# Patient Record
Sex: MALE | Race: BLACK OR AFRICAN AMERICAN | NOT HISPANIC OR LATINO | Employment: UNEMPLOYED | URBAN - METROPOLITAN AREA
[De-identification: names, ages, dates, MRNs, and addresses within clinical notes are randomized per-mention and may not be internally consistent; named-entity substitution may affect disease eponyms.]

---
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
## 2019-11-13 ENCOUNTER — HISTORICAL (OUTPATIENT)
Dept: ADMINISTRATIVE | Facility: HOSPITAL | Age: 19
End: 2019-11-13

## 2019-11-13 LAB
APPEARANCE, UA: CLEAR
BACTERIA #/AREA URNS AUTO: ABNORMAL /HPF
BILIRUB SERPL-MCNC: NEGATIVE MG/DL
BILIRUB UR QL STRIP: NEGATIVE
BLOOD URINE, POC: NEGATIVE
CLARITY, POC UA: CLEAR
COLOR UR: YELLOW
COLOR, POC UA: YELLOW
GLUCOSE (UA): NEGATIVE
GLUCOSE UR QL STRIP: NEGATIVE
HAV IGM SERPL QL IA: NONREACTIVE
HBV CORE IGM SERPL QL IA: NONREACTIVE
HBV SURFACE AG SERPL QL IA: NEGATIVE
HCV AB SERPL QL IA: NONREACTIVE
HGB UR QL STRIP: NEGATIVE
HIV 1+2 AB+HIV1 P24 AG SERPL QL IA: NONREACTIVE
HYALINE CASTS #/AREA URNS LPF: ABNORMAL /LPF
KETONES UR QL STRIP: NEGATIVE
KETONES UR QL STRIP: NEGATIVE
LEUKOCYTE EST, POC UA: NEGATIVE
LEUKOCYTE ESTERASE UR QL STRIP: NEGATIVE
NITRITE UR QL STRIP: NEGATIVE
NITRITE, POC UA: NEGATIVE
PH UR STRIP: 8 [PH] (ref 4.5–8)
PH, POC UA: 7.5
PROT UR QL STRIP: 10 MG/DL
PROTEIN, POC: NEGATIVE
RBC #/AREA URNS AUTO: ABNORMAL /HPF
SP GR UR STRIP: 1.02 (ref 1–1.03)
SPECIFIC GRAVITY, POC UA: 1.02
SQUAMOUS #/AREA URNS LPF: ABNORMAL /LPF
T PALLIDUM AB SER QL: NONREACTIVE
UROBILINOGEN UR STRIP-ACNC: 4 MG/DL
UROBILINOGEN, POC UA: NORMAL
WBC #/AREA URNS AUTO: ABNORMAL /HPF

## 2020-04-28 LAB
BILIRUB SERPL-MCNC: NEGATIVE MG/DL
BLOOD URINE, POC: NEGATIVE
CLARITY, POC UA: CLEAR
COLOR, POC UA: YELLOW
GLUCOSE UR QL STRIP: NEGATIVE
KETONES UR QL STRIP: NEGATIVE
LEUKOCYTE EST, POC UA: NEGATIVE
NITRITE, POC UA: NEGATIVE
PH, POC UA: 7.5
PROTEIN, POC: NEGATIVE
SPECIFIC GRAVITY, POC UA: 1.02
UROBILINOGEN, POC UA: NORMAL

## 2022-04-10 ENCOUNTER — HISTORICAL (OUTPATIENT)
Dept: ADMINISTRATIVE | Facility: HOSPITAL | Age: 22
End: 2022-04-10

## 2022-04-25 VITALS
SYSTOLIC BLOOD PRESSURE: 117 MMHG | DIASTOLIC BLOOD PRESSURE: 77 MMHG | WEIGHT: 174.19 LBS | BODY MASS INDEX: 24.94 KG/M2 | OXYGEN SATURATION: 100 % | HEIGHT: 70 IN
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
## 2022-09-18 ENCOUNTER — HISTORICAL (OUTPATIENT)
Dept: ADMINISTRATIVE | Facility: HOSPITAL | Age: 22
End: 2022-09-18

## 2024-02-19 ENCOUNTER — HOSPITAL ENCOUNTER (EMERGENCY)
Facility: HOSPITAL | Age: 24
Discharge: HOME | End: 2024-02-19
Attending: INTERNAL MEDICINE
Payer: MEDICAID

## 2024-02-19 VITALS
DIASTOLIC BLOOD PRESSURE: 85 MMHG | HEIGHT: 70 IN | BODY MASS INDEX: 30.39 KG/M2 | OXYGEN SATURATION: 99 % | SYSTOLIC BLOOD PRESSURE: 126 MMHG | HEART RATE: 64 BPM | TEMPERATURE: 99 F | WEIGHT: 212.31 LBS | RESPIRATION RATE: 17 BRPM

## 2024-02-19 DIAGNOSIS — S05.02XA ABRASION OF LEFT CORNEA, INITIAL ENCOUNTER: Primary | ICD-10-CM

## 2024-02-19 DIAGNOSIS — M25.571 CHRONIC PAIN OF RIGHT ANKLE: ICD-10-CM

## 2024-02-19 DIAGNOSIS — G89.29 CHRONIC PAIN OF RIGHT ANKLE: ICD-10-CM

## 2024-02-19 PROCEDURE — 25000003 PHARM REV CODE 250: Performed by: PHYSICIAN ASSISTANT

## 2024-02-19 PROCEDURE — 99284 EMERGENCY DEPT VISIT MOD MDM: CPT | Mod: 25

## 2024-02-19 RX ORDER — ERYTHROMYCIN 5 MG/G
OINTMENT OPHTHALMIC EVERY 4 HOURS
Qty: 3.5 G | Refills: 0 | Status: SHIPPED | OUTPATIENT
Start: 2024-02-19 | End: 2024-02-29

## 2024-02-19 RX ORDER — INDOMETHACIN 50 MG/1
50 CAPSULE ORAL 2 TIMES DAILY PRN
Qty: 15 CAPSULE | Refills: 0 | Status: SHIPPED | OUTPATIENT
Start: 2024-02-19

## 2024-02-19 RX ORDER — BACLOFEN 20 MG/1
20 TABLET ORAL 2 TIMES DAILY PRN
Qty: 15 TABLET | Refills: 0 | Status: SHIPPED | OUTPATIENT
Start: 2024-02-19

## 2024-02-19 RX ADMIN — FLUORESCEIN SODIUM 1 EACH: 1 STRIP OPHTHALMIC at 11:02

## 2024-02-19 NOTE — DISCHARGE INSTRUCTIONS
Report to Emergency Department if symptoms return or worsen; [facility] Within 1 to 2 days, It is important that you follow up with your primary care provider or specialist if indicated for further evaluation, workup, and treatment as necessary. The exam and treatment you received in Emergency Department was for an urgent problem and NOT INTENDED AS COMPLETE CARE. It is important that you FOLLOW UP with a doctor for ongoing care. If your symptoms become WORSE or you DO NOT IMPROVE and you are unable to reach your health care provider, you should RETURN to the Emergency Department. The Emergency Department provider has provided a PRELIMINARY INTERPRETATION of all your studies. A final interpretation may be done after you are discharged. If a change in your diagnosis or treatment is needed WE WILL CONTACT YOU. It is critical that we have a CURRENT PHONE NUMBER FOR YOU.
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
## 2024-04-02 ENCOUNTER — OFFICE VISIT (OUTPATIENT)
Dept: ORTHOPEDICS | Facility: CLINIC | Age: 24
End: 2024-04-02
Payer: MEDICAID

## 2024-04-02 VITALS
BODY MASS INDEX: 31.21 KG/M2 | HEIGHT: 69 IN | SYSTOLIC BLOOD PRESSURE: 133 MMHG | DIASTOLIC BLOOD PRESSURE: 95 MMHG | HEART RATE: 80 BPM | TEMPERATURE: 99 F | WEIGHT: 210.75 LBS

## 2024-04-02 DIAGNOSIS — Z76.89 ENCOUNTER TO ESTABLISH CARE: ICD-10-CM

## 2024-04-02 DIAGNOSIS — Z98.890 HISTORY OF OPEN REDUCTION AND INTERNAL FIXATION (ORIF) PROCEDURE: ICD-10-CM

## 2024-04-02 DIAGNOSIS — G89.28 CHRONIC PAIN FOLLOWING SURGERY OR PROCEDURE: Primary | ICD-10-CM

## 2024-04-02 PROCEDURE — 3075F SYST BP GE 130 - 139MM HG: CPT | Mod: CPTII,,, | Performed by: NURSE PRACTITIONER

## 2024-04-02 PROCEDURE — 1160F RVW MEDS BY RX/DR IN RCRD: CPT | Mod: CPTII,,, | Performed by: NURSE PRACTITIONER

## 2024-04-02 PROCEDURE — 99215 OFFICE O/P EST HI 40 MIN: CPT | Mod: PBBFAC | Performed by: NURSE PRACTITIONER

## 2024-04-02 PROCEDURE — 99215 OFFICE O/P EST HI 40 MIN: CPT | Mod: S$PBB,,, | Performed by: NURSE PRACTITIONER

## 2024-04-02 PROCEDURE — 3008F BODY MASS INDEX DOCD: CPT | Mod: CPTII,,, | Performed by: NURSE PRACTITIONER

## 2024-04-02 PROCEDURE — 3080F DIAST BP >= 90 MM HG: CPT | Mod: CPTII,,, | Performed by: NURSE PRACTITIONER

## 2024-04-02 PROCEDURE — 1159F MED LIST DOCD IN RCRD: CPT | Mod: CPTII,,, | Performed by: NURSE PRACTITIONER

## 2024-04-02 RX ORDER — MELOXICAM 7.5 MG/1
7.5 TABLET ORAL DAILY
Qty: 30 TABLET | Refills: 0 | Status: SHIPPED | OUTPATIENT
Start: 2024-04-02 | End: 2024-05-02
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
## 2024-10-07 ENCOUNTER — OFFICE VISIT (OUTPATIENT)
Dept: URGENT CARE | Facility: CLINIC | Age: 24
End: 2024-10-07
Payer: MEDICAID

## 2024-10-07 VITALS
SYSTOLIC BLOOD PRESSURE: 137 MMHG | HEIGHT: 70 IN | BODY MASS INDEX: 31.38 KG/M2 | HEART RATE: 69 BPM | WEIGHT: 219.19 LBS | DIASTOLIC BLOOD PRESSURE: 86 MMHG | RESPIRATION RATE: 18 BRPM | TEMPERATURE: 98 F | OXYGEN SATURATION: 99 %

## 2024-10-07 DIAGNOSIS — M25.70 BONE CALLUS: ICD-10-CM

## 2024-10-07 DIAGNOSIS — Z00.00 ENCOUNTER FOR MEDICAL EXAMINATION TO ESTABLISH CARE: ICD-10-CM

## 2024-10-07 DIAGNOSIS — M21.611 BUNION OF GREAT TOE OF RIGHT FOOT: Primary | ICD-10-CM

## 2024-10-07 PROCEDURE — 99203 OFFICE O/P NEW LOW 30 MIN: CPT | Mod: S$PBB,,, | Performed by: NURSE PRACTITIONER

## 2024-10-07 PROCEDURE — 99214 OFFICE O/P EST MOD 30 MIN: CPT | Mod: PBBFAC | Performed by: NURSE PRACTITIONER

## 2024-10-07 RX ORDER — IBUPROFEN 800 MG/1
800 TABLET ORAL 3 TIMES DAILY
Qty: 15 TABLET | Refills: 0 | Status: SHIPPED | OUTPATIENT
Start: 2024-10-07 | End: 2024-10-12

## 2024-10-07 RX ORDER — DICLOFENAC SODIUM 10 MG/G
2 GEL TOPICAL 4 TIMES DAILY PRN
Qty: 100 G | Refills: 2 | Status: SHIPPED | OUTPATIENT
Start: 2024-10-07

## 2024-10-07 NOTE — PROGRESS NOTES
"Subjective:      Patient ID: Pasquale Cm is a 23 y.o. male.    Vitals:  height is 5' 10" (1.778 m) and weight is 99.4 kg (219 lb 3.2 oz). His oral temperature is 98 °F (36.7 °C). His blood pressure is 137/86 and his pulse is 69. His respiration is 18 and oxygen saturation is 99%.     Chief Complaint: Foot Pain (Patient states bump on right foot x 2 weeks. States hard to wear shoes. Denies drainage.)    Foot Pain    As stated in chief complaint.    Skin:  Positive for erythema.      Objective:     Physical Exam   Constitutional: He is oriented to person, place, and time. He appears well-developed.  Non-toxic appearance. He does not appear ill. No distress.   HENT:   Head: Normocephalic and atraumatic.   Nose: No purulent discharge. Right sinus exhibits no maxillary sinus tenderness and no frontal sinus tenderness. Left sinus exhibits no maxillary sinus tenderness and no frontal sinus tenderness.   Mouth/Throat: Uvula is midline.   Eyes: Right eye exhibits no discharge. Left eye exhibits no discharge. Extraocular movement intact   Neck: Neck supple. No neck rigidity present.   Cardiovascular: Regular rhythm.   Pulmonary/Chest: Effort normal and breath sounds normal. No respiratory distress. He has no wheezes. He has no rhonchi. He has no rales.   Abdominal: Normal appearance.   Musculoskeletal: Normal range of motion.         General: Normal range of motion.      Right foot: Right great toe: Exhibits tenderness.        Feet:    Lymphadenopathy:     He has no cervical adenopathy.   Neurological: He is alert and oriented to person, place, and time.   Skin: Skin is warm, dry and not diaphoretic. Capillary refill takes less than 2 seconds. erythema         Comments: Hyperpigmented bunion of great toe on right foot with callus noted. TTP at joint and foot,  no swelling, deformity.   Psychiatric: His behavior is normal. Mood, judgment and thought content normal.   Nursing note and vitals reviewed.      Assessment:     1. " Bunion of great toe of right foot    2. Bone callus    3. Encounter for medical examination to establish care        Plan:   Prescription management with high-dose NSAID and topical diclofenac for pain  Patient instructed To help corns and calluses heal, and prevent new ones, you can:  ?Wear shoes and socks that fit properly. Tight shoes or shoes with high heels can cause corns and calluses.  ?Avoid going barefoot, or wearing shoes without socks.  ?Use special pads inside your shoes to prevent rubbing.  Referral for PCP sent today  Bunion of great toe of right foot  -     ibuprofen (ADVIL,MOTRIN) 800 MG tablet; Take 1 tablet (800 mg total) by mouth 3 (three) times daily. for 5 days  Dispense: 15 tablet; Refill: 0  -     diclofenac sodium (VOLTAREN ARTHRITIS PAIN) 1 % Gel; Apply 2 g topically 4 (four) times daily as needed (pain).  Dispense: 100 g; Refill: 2    Bone callus  -     diclofenac sodium (VOLTAREN ARTHRITIS PAIN) 1 % Gel; Apply 2 g topically 4 (four) times daily as needed (pain).  Dispense: 100 g; Refill: 2    Encounter for medical examination to establish care  -     Ambulatory referral/consult to Internal Medicine

## 2024-11-01 ENCOUNTER — HOSPITAL ENCOUNTER (EMERGENCY)
Facility: HOSPITAL | Age: 24
Discharge: HOME | End: 2024-11-01
Attending: EMERGENCY MEDICINE
Payer: MEDICAID

## 2024-11-01 VITALS
TEMPERATURE: 98 F | RESPIRATION RATE: 20 BRPM | HEIGHT: 70 IN | OXYGEN SATURATION: 98 % | SYSTOLIC BLOOD PRESSURE: 129 MMHG | HEART RATE: 92 BPM | WEIGHT: 200 LBS | DIASTOLIC BLOOD PRESSURE: 89 MMHG | BODY MASS INDEX: 28.63 KG/M2

## 2024-11-01 DIAGNOSIS — B07.8 OTHER VIRAL WARTS: Primary | ICD-10-CM

## 2024-11-01 DIAGNOSIS — L84 CALLUS OF FOOT: ICD-10-CM

## 2024-11-01 PROCEDURE — 99282 EMERGENCY DEPT VISIT SF MDM: CPT

## 2024-11-01 NOTE — ED PROVIDER NOTES
Encounter Date: 11/1/2024       History     Chief Complaint   Patient presents with    Foot Pain     Pt c/o R foot pain and bump near great toe     See MDM for details.      The history is provided by the patient. No  was used.     Review of patient's allergies indicates:  No Known Allergies  Past Medical History:   Diagnosis Date    Hypertension      Past Surgical History:   Procedure Laterality Date    LEG SURGERY  2019     No family history on file.  Social History     Tobacco Use    Smoking status: Never     Passive exposure: Never    Smokeless tobacco: Never   Substance Use Topics    Alcohol use: Never    Drug use: Never     Review of Systems   Constitutional: Negative.  Negative for activity change, appetite change, diaphoresis, fatigue and fever.   HENT:  Negative for rhinorrhea and sinus pressure.    Eyes: Negative.    Respiratory: Negative.  Negative for chest tightness.    Cardiovascular:  Negative for chest pain.   Gastrointestinal: Negative.  Negative for abdominal distention and abdominal pain.   Endocrine: Negative.    Genitourinary: Negative.    Musculoskeletal: Negative.  Negative for arthralgias.   Skin:  Positive for wound.   Allergic/Immunologic: Negative.    Neurological:  Negative for dizziness and headaches.   Hematological: Negative.    Psychiatric/Behavioral: Negative.         Physical Exam     Initial Vitals [11/01/24 1206]   BP Pulse Resp Temp SpO2   129/89 92 20 98.1 °F (36.7 °C) 98 %      MAP       --         Physical Exam    Nursing note and vitals reviewed.  Constitutional: He appears well-developed and well-nourished. He is cooperative. No distress.   HENT:   Head: Normocephalic and atraumatic. Not macrocephalic.   Right Ear: Tympanic membrane normal. Tympanic membrane is not erythematous.   Left Ear: Tympanic membrane normal. Tympanic membrane is not erythematous.   Nose: No mucosal edema. Right sinus exhibits no frontal sinus tenderness. Left sinus exhibits no  "frontal sinus tenderness. Mouth/Throat: Mucous membranes are normal. No oropharyngeal exudate.   Eyes: Conjunctivae and EOM are normal. Pupils are equal, round, and reactive to light. Right eye exhibits no discharge. Left eye exhibits no discharge.   Neck: Neck supple. No tracheal deviation present.   Normal range of motion.  Cardiovascular:  Normal rate and regular rhythm.           Pulmonary/Chest: Effort normal and breath sounds normal. No respiratory distress. He has no decreased breath sounds. He has no wheezes.   Musculoskeletal:         General: Normal range of motion.      Cervical back: Normal range of motion and neck supple.     Lymphadenopathy:        Head (right side): No submental adenopathy present.        Head (left side): No submental adenopathy present.     He has no cervical adenopathy.   Neurological: He is alert and oriented to person, place, and time. He has normal strength. No cranial nerve deficit. GCS score is 15. GCS eye subscore is 4. GCS verbal subscore is 5. GCS motor subscore is 6.   Skin: Skin is warm. Lesion noted.        Lesion right medial great toe. No obvious erythema or fluctuance.callus v wart.   No erythema or warmth to the area. Looks like a callus, but some seedy components.   Good ROM and +2DP pulse right foot.    Psychiatric: He has a normal mood and affect. His behavior is normal. Judgment and thought content normal.         ED Course   Procedures  Labs Reviewed - No data to display       Imaging Results    None          Medications - No data to display  Medical Decision Making  23yoAAM with "growth" right foot x 2weeks that has been rubbing on steel toed boots. Not applying anything. No trauma.    Problems Addressed:  Callus of foot: acute illness or injury     Details: Differential diagnosis included but not limited to:  Callus, wart, cellulitis    No erythema or warmth to the area. Looks like a callus, but some seedy components. Recommend moleskin and ASA gel " w/referrrals made.   Other viral warts: acute illness or injury    Risk  OTC drugs.                                      Clinical Impression:  Final diagnoses:  [B07.8] Other viral warts (Primary)  [L84] Callus of foot          ED Disposition Condition    Discharge Stable          ED Prescriptions       Medication Sig Dispense Start Date End Date Auth. Provider    salicylic acid 17 % gel Apply topically every 48 hours. 7 g 11/1/2024 1/30/2025 Bisi Denise PA          Follow-up Information       Follow up With Specialties Details Why Contact Info    [facility] Emergency Medicine  As needed, If symptoms worsen [address] [facility] [address] [phone]        This note was typed partially using voice recognition software.  Please be reminded that not all corrections/addendums to grammar may have been made prior to closing of this chart.       Bisi Denise PA  11/01/24 3808

## 2024-11-01 NOTE — DISCHARGE INSTRUCTIONS
Use gel as directed    Wear MOLESKIN so it doesn't rub on shoe     Wear comfortable fitting shoes.

## 2024-12-30 ENCOUNTER — OFFICE VISIT (OUTPATIENT)
Dept: URGENT CARE | Facility: CLINIC | Age: 24
End: 2024-12-30
Payer: MEDICAID

## 2024-12-30 VITALS
TEMPERATURE: 102 F | OXYGEN SATURATION: 100 % | BODY MASS INDEX: 28.63 KG/M2 | WEIGHT: 200 LBS | SYSTOLIC BLOOD PRESSURE: 147 MMHG | RESPIRATION RATE: 18 BRPM | DIASTOLIC BLOOD PRESSURE: 101 MMHG | HEIGHT: 70 IN | HEART RATE: 122 BPM

## 2024-12-30 DIAGNOSIS — J10.1 INFLUENZA A: ICD-10-CM

## 2024-12-30 DIAGNOSIS — R50.9 FEVER, UNSPECIFIED FEVER CAUSE: Primary | ICD-10-CM

## 2024-12-30 LAB
CTP QC/QA: YES
MOLECULAR STREP A: NEGATIVE
POC MOLECULAR INFLUENZA A AGN: POSITIVE
POC MOLECULAR INFLUENZA B AGN: NEGATIVE
SARS-COV-2 AG RESP QL IA.RAPID: NEGATIVE

## 2024-12-30 RX ORDER — IBUPROFEN 200 MG
600 TABLET ORAL
Status: COMPLETED | OUTPATIENT
Start: 2024-12-30 | End: 2024-12-30

## 2024-12-30 RX ORDER — OSELTAMIVIR PHOSPHATE 75 MG/1
75 CAPSULE ORAL 2 TIMES DAILY
Qty: 10 CAPSULE | Refills: 0 | Status: SHIPPED | OUTPATIENT
Start: 2024-12-30 | End: 2025-01-04

## 2024-12-30 RX ADMIN — Medication 600 MG: at 06:12

## 2024-12-30 NOTE — LETTER
December 30, 2024      [facility]  [address], [address]  [place] [address]  Phone: [phone]  Fax: [phone]       Patient: Pasquale Cm   YOB: 2000  Date of Visit: 12/30/2024    To Whom It May Concern:    Cece Cm  was at [facility] on 12/30/2024. The patient may return to work on 01/01/2024 or once fever free for 24 hours with no restrictions. If you have any questions or concerns, or if I can be of further assistance, please do not hesitate to contact me.    Sincerely,    Amira Bar LPN

## 2024-12-31 NOTE — PROGRESS NOTES
"Subjective:      Patient ID: Pasquale Cm is a 24 y.o. male.    Vitals:  height is 5' 10" (1.778 m) and weight is 90.7 kg (200 lb). His temperature is 101.7 °F (38.7 °C) (abnormal). His blood pressure is 147/101 (abnormal) and his pulse is 122 (abnormal). His respiration is 18 and oxygen saturation is 100%.     Chief Complaint: Fever    24 y.o. male presents to urgent care with complaints of  fever (t-max 101.8), headache, body aches, sore throat, productive cough, vomiting (1 episode today), diarrhea x1d. Alleviating factors used include one dose of Amoxicillin with no improvement. Denies neck stiffness, wheezing,SOB, CP, abdominal pain and rash.      Constitution: Positive for fever.   HENT:  Positive for congestion and sore throat.    Respiratory:  Positive for cough.       Objective:     Physical Exam   Constitutional: He is oriented to person, place, and time. He appears well-developed. He is cooperative.  Non-toxic appearance. He does not appear ill. No distress.   HENT:   Head: Normocephalic and atraumatic.   Ears:   Right Ear: Hearing, tympanic membrane, external ear and ear canal normal.   Left Ear: Hearing, tympanic membrane, external ear and ear canal normal.   Nose: Nose normal. No mucosal edema, rhinorrhea or nasal deformity. No epistaxis. Right sinus exhibits no maxillary sinus tenderness and no frontal sinus tenderness. Left sinus exhibits no maxillary sinus tenderness and no frontal sinus tenderness.   Mouth/Throat: Uvula is midline, oropharynx is clear and moist and mucous membranes are normal. No trismus in the jaw. Normal dentition. No uvula swelling. No oropharyngeal exudate, posterior oropharyngeal edema or posterior oropharyngeal erythema.   Eyes: Conjunctivae and lids are normal. No scleral icterus.   Neck: Trachea normal and phonation normal. Neck supple. No edema present. No erythema present. No neck rigidity present.   Cardiovascular: Regular rhythm, normal heart sounds and normal pulses. " Tachycardia present.   Pulmonary/Chest: Effort normal and breath sounds normal. No respiratory distress. He has no decreased breath sounds. He has no rhonchi.   Abdominal: Normal appearance.   Musculoskeletal: Normal range of motion.         General: No deformity. Normal range of motion.   Neurological: He is alert and oriented to person, place, and time. He exhibits normal muscle tone. Coordination normal.   Skin: Skin is warm, dry, intact, not diaphoretic and not pale.   Psychiatric: His speech is normal and behavior is normal. Judgment and thought content normal.   Nursing note and vitals reviewed.    Previous History:     Review of patient's allergies indicates:  No Known Allergies    Past Medical History:   Diagnosis Date    Hypertension      Current Outpatient Medications   Medication Instructions    baclofen (LIORESAL) 20 mg, Oral, 2 times daily PRN    diclofenac sodium (VOLTAREN ARTHRITIS PAIN) 2 g, Topical (Top), 4 times daily PRN    indomethacin (INDOCIN) 50 mg, Oral, 2 times daily PRN, Take with food    oseltamivir (TAMIFLU) 75 mg, Oral, 2 times daily    salicylic acid 17 % gel Topical (Top), Every 48 hours     Past Surgical History:   Procedure Laterality Date    LEG SURGERY  2019     No family history on file.    Social History     Tobacco Use    Smoking status: Never     Passive exposure: Never    Smokeless tobacco: Never   Substance Use Topics    Alcohol use: Never    Drug use: Never     Assessment:     1. Fever, unspecified fever cause    2. Influenza A        Plan:   -Rest and stay hydrated.  Take Tamiflu prescription medication for flu as directed  -Tylenol every 4 hours OR ibuprofen every 6 hours as needed for pain/fever.  -Flonase OTC or Nasacort OTC for nasal congestion.  -Warm face compresses to help with facial sinus pain/pressure.  -Simple foods like chicken noodle soup.  -Delsym helps with coughing at night  -Zyrtec/Claritin during the day & Benadryl at night may help with allergies.  Please  follow up with your primary care provider within 2-5 days if your signs and symptoms have not resolved or worsen.   If your condition worsens or fails to improve we recommend that you receive another evaluation at the emergency room immediately or contact your primary medical clinic to discuss your concerns.   The CDC now recommends  that you stay home for at least 24 hours after your fever is gone.  Your fever should be gone without the use of a fever reducing medicine.  Stay away from others as much as possible to keep from making other sick.       Fever, unspecified fever cause  -     POCT Influenza A/B MOLECULAR  -     POCT Strep A, Molecular  -     SARS Coronavirus 2 Antigen, POCT Manual Read  -     ibuprofen tablet 600 mg    Influenza A  -     oseltamivir (TAMIFLU) 75 MG capsule; Take 1 capsule (75 mg total) by mouth 2 (two) times daily. for 5 days  Dispense: 10 capsule; Refill: 0

## 2024-12-31 NOTE — PATIENT INSTRUCTIONS
-Rest and stay hydrated.  Take Tamiflu prescription medication for flu as directed  -Tylenol every 4 hours OR ibuprofen every 6 hours as needed for pain/fever.  -Flonase OTC or Nasacort OTC for nasal congestion.  -Warm face compresses to help with facial sinus pain/pressure.  -Simple foods like chicken noodle soup.  -Delsym helps with coughing at night  -Zyrtec/Claritin during the day & Benadryl at night may help with allergies.  Please follow up with your primary care provider within 2-5 days if your signs and symptoms have not resolved or worsen.   If your condition worsens or fails to improve we recommend that you receive another evaluation at the emergency room immediately or contact your primary medical clinic to discuss your concerns.   The CDC now recommends  that you stay home for at least 24 hours after your fever is gone.  Your fever should be gone without the use of a fever reducing medicine.  Stay away from others as much as possible to keep from making other sick.

## 2025-04-23 ENCOUNTER — OFFICE VISIT (OUTPATIENT)
Dept: URGENT CARE | Facility: CLINIC | Age: 25
End: 2025-04-23
Payer: MEDICAID

## 2025-04-23 ENCOUNTER — RESULTS FOLLOW-UP (OUTPATIENT)
Dept: URGENT CARE | Facility: CLINIC | Age: 25
End: 2025-04-23

## 2025-04-23 VITALS
TEMPERATURE: 99 F | SYSTOLIC BLOOD PRESSURE: 133 MMHG | HEART RATE: 86 BPM | DIASTOLIC BLOOD PRESSURE: 62 MMHG | HEIGHT: 70 IN | OXYGEN SATURATION: 99 % | BODY MASS INDEX: 29.92 KG/M2 | RESPIRATION RATE: 18 BRPM | WEIGHT: 209 LBS

## 2025-04-23 DIAGNOSIS — M79.641 RIGHT HAND PAIN: ICD-10-CM

## 2025-04-23 DIAGNOSIS — S60.511A ABRASION OF RIGHT HAND, INITIAL ENCOUNTER: ICD-10-CM
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
## 2025-04-23 DIAGNOSIS — S60.221A CONTUSION OF RIGHT HAND, INITIAL ENCOUNTER: Primary | ICD-10-CM

## 2025-04-23 RX ORDER — MUPIROCIN 20 MG/G
OINTMENT TOPICAL 3 TIMES DAILY
Qty: 15 G | Refills: 1 | Status: SHIPPED | OUTPATIENT
Start: 2025-04-23 | End: 2025-04-30

## 2025-04-23 RX ORDER — DICLOFENAC SODIUM 75 MG/1
75 TABLET, DELAYED RELEASE ORAL 2 TIMES DAILY
Qty: 20 TABLET | Refills: 0 | Status: SHIPPED | OUTPATIENT
Start: 2025-04-23 | End: 2025-05-03

## 2025-04-23 NOTE — PROGRESS NOTES
"Subjective:      Patient ID: Pasquale Cm is a 24 y.o. male.    Vitals:  height is 5' 10" (1.778 m) and weight is 94.8 kg (209 lb). His oral temperature is 98.7 °F (37.1 °C). His blood pressure is 133/62 and his pulse is 86. His respiration is 18 and oxygen saturation is 99%.     Chief Complaint: Hand Pain     Patient is a 24 y.o. male who presents to urgent care with complaints of right hand pain and abrasion after fall x 4 days Alleviating factors include Neosporin ointment with no relief.       Constitution: Negative.   HENT: Negative.     Neck: neck negative.   Cardiovascular: Negative.    Eyes: Negative.    Respiratory: Negative.     Gastrointestinal: Negative.    Endocrine: negative.   Genitourinary: Negative.    Musculoskeletal:  Positive for pain and trauma.   Skin:  Positive for abrasion.   Allergic/Immunologic: Negative.    Neurological: Negative.    Hematologic/Lymphatic: Negative.    Psychiatric/Behavioral: Negative.        Objective:     Physical Exam   Constitutional: He is oriented to person, place, and time. He appears well-developed. He is cooperative. He does not appear ill.   HENT:   Head: Normocephalic and atraumatic.   Ears:   Right Ear: Hearing, tympanic membrane, external ear and ear canal normal.   Left Ear: Hearing, tympanic membrane, external ear and ear canal normal.   Nose: Nose normal. No mucosal edema or nasal deformity. No epistaxis. Right sinus exhibits no maxillary sinus tenderness and no frontal sinus tenderness. Left sinus exhibits no maxillary sinus tenderness and no frontal sinus tenderness.   Mouth/Throat: Uvula is midline, oropharynx is clear and moist and mucous membranes are normal. Mucous membranes are moist. No trismus in the jaw. Normal dentition. No uvula swelling.   Eyes: Conjunctivae and lids are normal.   Neck: Trachea normal and phonation normal. Neck supple.   Cardiovascular: Normal rate, regular rhythm, normal heart sounds and normal pulses.   Pulmonary/Chest: " Effort normal and breath sounds normal.   Abdominal: Normal appearance and bowel sounds are normal. Soft.   Musculoskeletal: Normal range of motion.         General: Normal range of motion.        Hands:    Lymphadenopathy:     He has no cervical adenopathy.   Neurological: no focal deficit. He is alert and oriented to person, place, and time. He exhibits normal muscle tone.   Skin: Skin is warm, dry and intact. Capillary refill takes less than 2 seconds.   Psychiatric: His speech is normal and behavior is normal. Judgment and thought content normal.   Nursing note and vitals reviewed.    Advised patient that x-ray was negative but xray will be read by a radiologist at the hospital.  If any discrepancies were noted we will call you with the results.  EXAMINATION:  XR HAND COMPLETE 3 VIEW RIGHT     CLINICAL HISTORY:  Pain in right hand     COMPARISON:  None.     FINDINGS:  No acute displaced fractures or dislocations.     Joint spaces preserved.     No blastic or lytic lesions.     Soft tissues within normal limits.     Impression:     No acute osseous abnormality.        Electronically signed by:Jimenez Cornell  Date:                                            04/23/2025  Time:                                           16:25  Assessment:     1. Contusion of right hand, initial encounter    2. Right hand pain    3. Fall, initial encounter    4. Abrasion of right hand, initial encounter        Plan:   Instructions:      A contusion is a bruise that appears on your skin after an injury. A bruise happens when small blood vessels tear but skin does not. Blood leaks into nearby tissue, such as soft tissue or muscle.     DISCHARGE INSTRUCTIONS:  Return to the emergency department if:  You have new trouble moving the injured area.  You have tingling or numbness in or near the injured area.  Your hand or foot below the bruise gets cold or turns pale.  Call your doctor if:  You find a new lump in the injured area.  Your  symptoms do not improve with treatment after 4 to 5 days.  You have questions or concerns about your condition or care.  Medicines:  You may need any of the following:  NSAIDs help decrease swelling and pain or fever. This medicine is available with or without a doctor's order. NSAIDs can cause stomach bleeding or kidney problems in certain people. If you take blood thinner medicine, always ask your healthcare provider if NSAIDs are safe for you. Always read the medicine label and follow directions.  Prescription pain medicine may be given. Ask your healthcare provider how to take this medicine safely. Some prescription pain medicines contain acetaminophen. Do not take other medicines that contain acetaminophen without talking to your healthcare provider. Too much acetaminophen may cause liver damage. Prescription pain medicine may cause constipation. Ask your healthcare provider how to prevent or treat constipation.  Take your medicine as directed. Contact your healthcare provider if you think your medicine is not helping or if you have side effects. Tell your provider if you are allergic to any medicine. Keep a list of the medicines, vitamins, and herbs you take. Include the amounts, and when and why you take them. Bring the list or the pill bottles to follow-up visits. Carry your medicine list with you in case of an emergency.  Help a contusion heal:  Rest the injured area or use it less than usual. If you bruised your leg or foot, you may need crutches or a cane to help you walk. This will help you keep weight off your injured body part.  Apply ice to decrease swelling and pain. Ice may also help prevent tissue damage. Use an ice pack, or put crushed ice in a plastic bag. Cover it with a towel and place it on your bruise for 15 to 20 minutes every hour or as directed.  Use compression to support the area and decrease swelling. Wrap an elastic bandage around the area over the bruised muscle. Make sure the bandage  is not too tight. You should be able to fit 1 finger between the bandage and your skin.  Elevate (raise) your injured body part above the level of your heart to help decrease pain and swelling. Use pillows, blankets, or rolled towels to elevate the area as often as you can.  Do not drink alcohol as directed. Alcohol may slow healing.  Do not stretch injured muscles right after your injury. Ask your healthcare provider when and how you may safely stretch after your injury. Gentle stretches can help increase your flexibility.  Do not massage the area or put heating pads on the bruise right after your injury. Heat and massage may slow healing. Your healthcare provider may tell you to apply heat after several days. At that time, heat will start to help the injury heal.      Contusion of right hand, initial encounter  -     diclofenac (VOLTAREN) 75 MG EC tablet; Take 1 tablet (75 mg total) by mouth 2 (two) times daily. for 10 days  Dispense: 20 tablet; Refill: 0  -     SPLINT FOR HOME USE    Right hand pain  -     XR HAND COMPLETE 3 VIEW RIGHT; Future; Expected date: 04/23/2025    Fall, initial encounter  -     XR HAND COMPLETE 3 VIEW RIGHT; Future; Expected date: 04/23/2025    Abrasion of right hand, initial encounter  -     mupirocin (BACTROBAN) 2 % ointment; Apply topically 3 (three) times daily. for 7 days  Dispense: 15 g; Refill: 1